# Patient Record
Sex: FEMALE | Race: OTHER | HISPANIC OR LATINO | Employment: FULL TIME | URBAN - METROPOLITAN AREA
[De-identification: names, ages, dates, MRNs, and addresses within clinical notes are randomized per-mention and may not be internally consistent; named-entity substitution may affect disease eponyms.]

---

## 2018-08-07 ENCOUNTER — ANESTHESIA (OUTPATIENT)
Dept: PERIOP | Facility: HOSPITAL | Age: 53
End: 2018-08-07
Payer: COMMERCIAL

## 2018-08-07 ENCOUNTER — HOSPITAL ENCOUNTER (OUTPATIENT)
Facility: HOSPITAL | Age: 53
Setting detail: OUTPATIENT SURGERY
Discharge: HOME | End: 2018-08-07
Attending: COLON & RECTAL SURGERY | Admitting: COLON & RECTAL SURGERY
Payer: COMMERCIAL

## 2018-08-07 ENCOUNTER — ANESTHESIA EVENT (OUTPATIENT)
Dept: PERIOP | Facility: HOSPITAL | Age: 53
End: 2018-08-07
Payer: COMMERCIAL

## 2018-08-07 VITALS
RESPIRATION RATE: 18 BRPM | HEIGHT: 67 IN | TEMPERATURE: 98 F | OXYGEN SATURATION: 98 % | SYSTOLIC BLOOD PRESSURE: 127 MMHG | BODY MASS INDEX: 29.82 KG/M2 | DIASTOLIC BLOOD PRESSURE: 78 MMHG | HEART RATE: 70 BPM | WEIGHT: 190 LBS

## 2018-08-07 DIAGNOSIS — Z12.11 ENCOUNTER FOR SCREENING FOR MALIGNANT NEOPLASM OF COLON: ICD-10-CM

## 2018-08-07 DIAGNOSIS — Z80.0 FAMILY HISTORY OF MALIGNANT NEOPLASM OF DIGESTIVE ORGAN: ICD-10-CM

## 2018-08-07 PROCEDURE — 88305 TISSUE EXAM BY PATHOLOGIST: CPT | Performed by: PATHOLOGY

## 2018-08-07 RX ORDER — PROPOFOL 10 MG/ML
INJECTION, EMULSION INTRAVENOUS AS NEEDED
Status: DISCONTINUED | OUTPATIENT
Start: 2018-08-07 | End: 2018-08-07 | Stop reason: SURG

## 2018-08-07 RX ORDER — SODIUM CHLORIDE 9 MG/ML
50 INJECTION, SOLUTION INTRAVENOUS CONTINUOUS
Status: DISCONTINUED | OUTPATIENT
Start: 2018-08-07 | End: 2018-08-08 | Stop reason: HOSPADM

## 2018-08-07 RX ADMIN — PROPOFOL 20 MG: 10 INJECTION, EMULSION INTRAVENOUS at 11:07

## 2018-08-07 RX ADMIN — PROPOFOL 20 MG: 10 INJECTION, EMULSION INTRAVENOUS at 11:06

## 2018-08-07 RX ADMIN — PROPOFOL 50 MG: 10 INJECTION, EMULSION INTRAVENOUS at 11:00

## 2018-08-07 RX ADMIN — PROPOFOL 20 MG: 10 INJECTION, EMULSION INTRAVENOUS at 10:57

## 2018-08-07 RX ADMIN — PROPOFOL 120 MG: 10 INJECTION, EMULSION INTRAVENOUS at 10:54

## 2018-08-07 RX ADMIN — PROPOFOL 60 MG: 10 INJECTION, EMULSION INTRAVENOUS at 10:55

## 2018-08-07 RX ADMIN — SODIUM CHLORIDE 50 ML/HR: 9 INJECTION, SOLUTION INTRAVENOUS at 09:41

## 2018-08-07 RX ADMIN — PROPOFOL 50 MG: 10 INJECTION, EMULSION INTRAVENOUS at 10:59

## 2018-08-07 NOTE — PERIOPERATIVE NURSING NOTE
ivs out  Tolerated diet  Denies pain  oob to br and passed a large amount of flatus  Eating  Discharged via ambulatory after discharge instructions given and verbalized an understanding of same

## 2018-08-07 NOTE — OP NOTE
OPERATIVE REPORT  PATIENT NAME: Evelina Sales    :  1965  MRN: [id number]  Pt Location:  GI ROOM 02    SURGERY DATE: 2018    Indications:  Screening colonoscopy and a high risk patient with family history of colorectal cancer  Procedure:  Colonoscopy to cecum with polypectomies  Findings:  Rectal and sigmoid polyps  Anesthesia Type: IV Sedation with Anesthesia    Complications: None      Procedure: The patient was in the left lateral position  Sedation was administered by anesthesia  Rectal exam was unremarkable  The scope was introduced and passed without difficulty to the cecum  This was confirmed by the ileocecal valve and appendiceal orifice  The scope was withdrawn  The bowel preparation was good  There are scattered sigmoid diverticulosis  In the distal sigmoid and rectum were 2 5 mm polyps excised by cold snare and collected  Impression:  Status post polypectomy and high risk patient with family history of colorectal cancer  Plan:  Repeat colonoscopy 3 years  Specimen(s):  ID Type Source Tests Collected by Time Destination   1 : sigmoid and Rectum Tissue Polyp, Colorectal TISSUE EXAM Sudhir Manuel MD 2018 1106          Attestation: I was present for the entire procedure        Patient Disposition: PACU      SIGNATURE: Sudhir Manuel MD  DATE: 2018  TIME: 11:15 AM

## 2018-08-07 NOTE — ANESTHESIA PREPROCEDURE EVALUATION
Review of Systems/Medical History      No history of anesthetic complications     Cardiovascular  Exercise tolerance (METS): >4,     Pulmonary  Sleep apnea Sleep Study completed,        GI/Hepatic            Endo/Other     GYN       Hematology   Musculoskeletal       Neurology   Psychology           Physical Exam    Airway    Mallampati score: II  TM Distance: >3 FB  Neck ROM: full     Dental   lower dentures,     Cardiovascular  Cardiovascular exam normal    Pulmonary  Pulmonary exam normal     Other Findings        Anesthesia Plan  ASA Score- 2     Anesthesia Type- IV sedation with anesthesia with ASA Monitors  Additional Monitors:   Airway Plan:         Plan Factors-Patient not instructed to abstain from smoking on day of procedure  Patient did not smoke on day of surgery  Induction- intravenous  Postoperative Plan-     Informed Consent- Anesthetic plan and risks discussed with patient

## 2018-08-07 NOTE — NURSING NOTE
Received patient from GI Lab  VSS  Denies pain but has some cramping  Patient wishes to use bathroom

## 2018-08-07 NOTE — H&P
[facility]  HISTORY AND PHYSICAL EXAM  Abdelrahman Clayton 46 y o  female MRN: [id number]  Unit/Bed#: JAZMINE WOODARD Encounter: [id number]    Assessment/Plan     Indication for colonoscopy: Family History of Colon Cancer    Plan:  Flexible Colonoscopy    Review of Systems    Historical Information   Past Medical History:   Diagnosis Date    Sleep apnea     recently diagnosis     Past Surgical History:   Procedure Laterality Date    BACK SURGERY      fusion    COLONOSCOPY      HEMORROIDECTOMY       Social History   History   Alcohol Use No     History   Drug use: Unknown     History   Smoking Status    Never Smoker   Smokeless Tobacco    Never Used     Family History: non-contributory    Meds/Allergies   all medications and allergies reviewed  Allergies   Allergen Reactions    Morphine Hallucinations    Penicillins Rash       Objective   First Vitals:   Blood Pressure: 123/70 (08/07/18 0927)  Temperature: 97 7 °F (36 5 °C) (08/07/18 0927)  Temp Source: Temporal (08/07/18 0927)  Respirations: 20 (08/07/18 0927)  Height: 5' 7" (170 2 cm) (08/07/18 0927)  Weight - Scale: 86 2 kg (190 lb) (08/07/18 0927)  SpO2: 95 % (08/07/18 0927)    Current Vitals:   Blood Pressure: 123/70 (08/07/18 0927)  Temperature: 97 7 °F (36 5 °C) (08/07/18 0927)  Temp Source: Temporal (08/07/18 0927)  Respirations: 20 (08/07/18 0927)  Height: 5' 7" (170 2 cm) (08/07/18 0927)  Weight - Scale: 86 2 kg (190 lb) (08/07/18 0927)  SpO2: 95 % (08/07/18 0927)    No intake or output data in the 24 hours ending 08/07/18 1050    Invasive Devices     Peripheral Intravenous Line            Peripheral IV 08/07/18 Right Antecubital less than 1 day                Physical Exam    HEENT: Normocephalic, atraumatic  Lungs: Clear  Heart: Regular rate and rhythm  Abdomen: Soft  Nondistended  Nontender  Extremities: No edema    Lab Results: I have personally reviewed pertinent lab results        EKG, Pathology, and Other Studies: I have personally reviewed pertinent reports

## 2018-08-07 NOTE — DISCHARGE SUMMARY
[facility]                                                                                 DISCHARGE SUMMARY        PATIENT NAME: Chelsea Richter    :  1965  MRN: [id number]  Pt Location:  GI ROOM 02    DISCHARGE DATE: 2018    PROCEDURE: Procedure(s) (LRB):  COLONOSCOPY (N/A)    Anesthesia Type: IV Sedation with Anesthesia    Complications: None    Specimen(s):  ID Type Source Tests Collected by Time Destination   1 : sigmoid and Rectum Tissue Polyp, Colorectal TISSUE EXAM Lorenzo James MD 2018 1106        HOSPITAL COURSE: The patient was admitted for elective procedure  The procedure was uncomplicated and the the patient was discharged home post procedure          SIGNATURE: Lorenzo James MD  DATE: 2018  TIME: 11:16 AM

## 2018-08-07 NOTE — DISCHARGE INSTRUCTIONS
[facility]  [facility] [address], [address] [place] [address] [address]  Phone: [phone]    DISCHARGE INSTRUCTIONS:    1   ___ Complete Exam - Normal    2   ___ Exam normal, but entire colon not seen  We will discuss this with you  3   ___ Polyp(s) removed by "burning" - NO pathology report will follow    4   _2__ Polyp(s) removed by excision  Pathology report will be available in 4-5 days   Someone from our office will call you with results  5   ___ Exam prompted biopsies  Pathology report will be available in 4-5 days   Someone from our office will call you with results  6   ___ Exam demonstrated findings that need treatment  Prescriptions will be   Given to you  Return to our office in ____ weeks  Please call for appt  7   ___ Original office visit or colonoscopy findings necessitate an office visit  Please call to set up a new appointment    8   ___ Medication  __________________________________________        [address]:    - Go straight home and rest today    - No driving or drinking alcohol for 24 hours    - Resume regular diet and medications unless otherwise instructed  Coumadin and Plavix are blood thinners  You can resume these medications on __________      IF YOU ARE HAVING ANY FEVER, BLEEDING OR PERSISTENT PAIN IN THE ABDOMEN, PLEASE CALL OUR OFFICE ANY DAY OR TIME  [phone]

## 2018-09-04 ENCOUNTER — OFFICE VISIT (OUTPATIENT)
Dept: URGENT CARE | Age: 53
End: 2018-09-04
Payer: COMMERCIAL

## 2018-09-04 VITALS
BODY MASS INDEX: 30.29 KG/M2 | SYSTOLIC BLOOD PRESSURE: 146 MMHG | RESPIRATION RATE: 16 BRPM | TEMPERATURE: 98.7 F | OXYGEN SATURATION: 96 % | HEIGHT: 67 IN | WEIGHT: 193 LBS | DIASTOLIC BLOOD PRESSURE: 83 MMHG | HEART RATE: 85 BPM

## 2018-09-04 DIAGNOSIS — J02.9 ACUTE PHARYNGITIS, UNSPECIFIED ETIOLOGY: Primary | ICD-10-CM

## 2018-09-04 PROCEDURE — 99283 EMERGENCY DEPT VISIT LOW MDM: CPT | Performed by: PHYSICIAN ASSISTANT

## 2018-09-04 PROCEDURE — G0382 LEV 3 HOSP TYPE B ED VISIT: HCPCS | Performed by: PHYSICIAN ASSISTANT

## 2018-09-04 RX ORDER — DICLOFENAC SODIUM 75 MG/1
75 TABLET, DELAYED RELEASE ORAL
COMMUNITY
Start: 2018-06-26 | End: 2019-06-26

## 2018-09-04 RX ORDER — BIOTIN 5 MG
5 TABLET ORAL
COMMUNITY

## 2018-09-04 RX ORDER — ASCORBIC ACID 100 MG
1 TABLET,CHEWABLE ORAL
COMMUNITY

## 2018-09-04 RX ORDER — FLUTICASONE PROPIONATE 50 MCG
2 SPRAY, SUSPENSION (ML) NASAL
COMMUNITY
Start: 2018-07-19 | End: 2019-07-19

## 2018-09-04 RX ORDER — AMOXICILLIN 500 MG/1
1000 CAPSULE ORAL EVERY 24 HOURS
Qty: 20 CAPSULE | Refills: 0 | Status: SHIPPED | OUTPATIENT
Start: 2018-09-04 | End: 2018-09-14

## 2018-09-04 RX ORDER — MULTIVIT WITH MINERALS/LUTEIN
1000 TABLET ORAL
COMMUNITY

## 2018-09-04 NOTE — PROGRESS NOTES
[address]        NAME: John Nurse is a 46 y o  female  : 1965    MRN: [id number]  DATE: 2018  TIME: 3:37 PM    Assessment and Plan   Acute pharyngitis, unspecified etiology [J02 9]  1  Acute pharyngitis, unspecified etiology  amoxicillin (AMOXIL) 500 mg capsule         Patient Instructions     Motrin and/or Tylenol as needed for fever and pain  Take antibiotic as directed until finished  Follow up with PCP in 3-5 days  Proceed to  ER if symptoms worsen  Chief Complaint     Chief Complaint   Patient presents with    Sore Throat     Pt c/o sore throat and fatigue  Pt took care of granddaughter over the weekend who was just diagnosed with strep  Pt states she drank from the same cup as her granddaughter  History of Present Illness       80-year-old female presents with 2 day history of sore throat  Patient reports she was watching her granddaughter this weekend and she was recently diagnosed with strep throat  Think she may have shared a drink with her  Patient now has sore throat fevers body aches pain  Sore Throat    This is a new problem  The current episode started yesterday  The problem has been waxing and waning  Neither side of throat is experiencing more pain than the other  Maximum temperature: Subjective  The fever has been present for less than 1 day  The pain is moderate  Associated symptoms include swollen glands  Pertinent negatives include no abdominal pain, coughing, diarrhea, drooling, ear discharge, ear pain, headaches, hoarse voice, neck pain, shortness of breath or stridor  She has had exposure to strep (Granddaughter)  She has tried nothing for the symptoms  The treatment provided no relief  Review of Systems   Review of Systems   Constitutional: Negative  HENT: Positive for sore throat  Negative for drooling, ear discharge, ear pain and hoarse voice  Eyes: Negative  Respiratory: Negative    Negative for cough, shortness of breath and stridor  Cardiovascular: Negative  Gastrointestinal: Negative for abdominal pain and diarrhea  Musculoskeletal: Negative  Negative for neck pain  Skin: Negative  Neurological: Negative for headaches           Current Medications       Current Outpatient Prescriptions:     Ascorbic Acid (VITAMIN C) 100 MG CHEW, Chew 1 tablet, Disp: , Rfl:     Biotin 5 MG TABS, Take 5 mg by mouth, Disp: , Rfl:     Calcium Carbonate (CALCIUM-CARB 600 PO), Take 1 tablet by mouth, Disp: , Rfl:     Cholecalciferol (D3-1000) 1000 units tablet, Take 2,000 Units by mouth, Disp: , Rfl:     diclofenac (VOLTAREN) 75 mg EC tablet, Take 75 mg by mouth, Disp: , Rfl:     fluticasone (FLONASE) 50 mcg/act nasal spray, 2 sprays into each nostril, Disp: , Rfl:     Glucosamine-Chondroitin--400-200 MG TABS, , Disp: , Rfl:     Omega-3 Fatty Acids (FISH OIL PO), , Disp: , Rfl:     thiamine 50 MG tablet, Take 50 mg by mouth, Disp: , Rfl:     vitamin E, tocopherol, 1,000 units capsule, Take 1,000 Units by mouth, Disp: , Rfl:     amoxicillin (AMOXIL) 500 mg capsule, Take 2 capsules (1,000 mg total) by mouth every 24 hours for 10 days, Disp: 20 capsule, Rfl: 0    Current Allergies     Allergies as of 09/04/2018 - Reviewed 09/04/2018   Allergen Reaction Noted    Morphine Hallucinations 08/07/2018    Penicillins Rash 08/07/2018    Sulfamethoxazole-trimethoprim GI Intolerance 05/04/2018            The following portions of the patient's history were reviewed and updated as appropriate: allergies, current medications, past family history, past medical history, past social history, past surgical history and problem list      Past Medical History:   Diagnosis Date    Allergic rhinitis     Sleep apnea     recently diagnosis       Past Surgical History:   Procedure Laterality Date    BACK SURGERY      fusion    COLONOSCOPY      HEMORROIDECTOMY      GA COLONOSCOPY FLX DX W/COLLJ SPEC WHEN PFRMD N/A 8/7/2018 Procedure: COLONOSCOPY;  Surgeon: Maria C Edouard MD;  Location: [address] [place] [address] GI LAB; Service: Colorectal       History reviewed  No pertinent family history  Medications have been verified  Objective   /83   Pulse 85   Temp 98 7 °F (37 1 °C) (Tympanic)   Resp 16   Ht 5' 7" (1 702 m)   Wt 87 5 kg (193 lb)   SpO2 96%   BMI 30 23 kg/m²        Physical Exam     Physical Exam   Constitutional: She is oriented to person, place, and time  She appears well-developed and well-nourished  No distress  HENT:   Head: Normocephalic and atraumatic  Right Ear: External ear normal    Left Ear: External ear normal    Nose: Nose normal    Mouth/Throat: Uvula is midline and mucous membranes are normal  Posterior oropharyngeal erythema present  No oropharyngeal exudate, posterior oropharyngeal edema or tonsillar abscesses  Eyes: Conjunctivae are normal  Right eye exhibits no discharge  Left eye exhibits no discharge  Neck: Normal range of motion  Neck supple  Cardiovascular: Normal rate, regular rhythm, normal heart sounds and intact distal pulses  No murmur heard  Pulmonary/Chest: Effort normal and breath sounds normal  No respiratory distress  She has no wheezes  She has no rales  Abdominal: Soft  Bowel sounds are normal  There is no tenderness  Musculoskeletal: Normal range of motion  Lymphadenopathy:     She has cervical adenopathy (Anterior cervical lymphadenopathy noted)  Neurological: She is alert and oriented to person, place, and time  Skin: Skin is warm and dry  Psychiatric: She has a normal mood and affect  Nursing note and vitals reviewed

## 2018-09-04 NOTE — LETTER
September 4, 2018     Patient: Darryl Rincon   YOB: 1965   Date of Visit: 9/4/2018       To Whom it May Concern:    Darryl Rincon was seen in my clinic on 9/4/2018  She may return to work on 09/05/2018  If you have any questions or concerns, please don't hesitate to call  Sincerely,          Lesley Levin PA-C        CC: No Recipients

## 2018-09-04 NOTE — PATIENT INSTRUCTIONS
Motrin and/or Tylenol as needed for fever and pain  Take antibiotic as directed until finished  Follow up with PCP in 3-5 days  Proceed to  ER if symptoms worsen  Pharyngitis   AMBULATORY CARE:   Pharyngitis , or sore throat, is inflammation of the tissues and structures in your pharynx (throat)  Pharyngitis is most often caused by bacteria  It may also be caused by a cold or flu virus  Other causes include smoking, allergies, or acid reflux  Signs and symptoms that may occur with pharyngitis:   · Sore throat or pain when you swallow    · Fever, chills, and body aches    · Hoarse or raspy voice    · Cough, runny or stuffy nose, itchy or watery eyes    · Headache    · Upset stomach and loss of appetite    · Mild neck stiffness    · Swollen glands that feel like hard lumps when you touch your neck    · White and yellow pus-filled blisters in the back of your throat  Call 911 for any of the following:   · You have trouble breathing or swallowing because your throat is swollen or sore  Seek care immediately if:   · You are drooling because it hurts too much to swallow  · Your fever is higher than 102? F (39?C) or lasts longer than 3 days  · You are confused  · You taste blood in your throat  Contact your healthcare provider if:   · Your throat pain gets worse  · You have a painful lump in your throat that does not go away after 5 days  · Your symptoms do not improve after 5 days  · You have questions or concerns about your condition or care  Treatment for pharyngitis:  Viral pharyngitis will go away on its own without treatment  Your sore throat should start to feel better in 3 to 5 days for both viral and bacterial infections  You may need any of the following:  · Antibiotics  treat a bacterial infection  · NSAIDs , such as ibuprofen, help decrease swelling, pain, and fever  NSAIDs can cause stomach bleeding or kidney problems in certain people   If you take blood thinner medicine, always ask your healthcare provider if NSAIDs are safe for you  Always read the medicine label and follow directions  · Acetaminophen  decreases pain and fever  It is available without a doctor's order  Ask how much to take and how often to take it  Follow directions  Acetaminophen can cause liver damage if not taken correctly  Manage your symptoms:   · Gargle salt water  Mix ¼ teaspoon salt in an 8 ounce glass of warm water and gargle  This may help decrease swelling in your throat  · Drink liquids as directed  You may need to drink more liquids than usual  Liquids may help soothe your throat and prevent dehydration  Ask how much liquid to drink each day and which liquids are best for you  · Use a cool-steam humidifier  to help moisten the air in your room and calm your cough  · Soothe your throat  with cough drops, ice, soft foods, or popsicles  Prevent the spread of pharyngitis:  Cover your mouth and nose when you cough or sneeze  Do not share food or drinks  Wash your hands often  Use soap and water  If soap and water are unavailable, use an alcohol based hand   Follow up with your healthcare provider as directed:  Write down your questions so you remember to ask them during your visits  © 2017 [address]  Information is for End User's use only and may not be sold, redistributed or otherwise used for commercial purposes  All illustrations and images included in CareNotes® are the copyrighted property of [employer]  or [employer]  The above information is an  only  It is not intended as medical advice for individual conditions or treatments  Talk to your doctor, nurse or pharmacist before following any medical regimen to see if it is safe and effective for you

## 2020-12-01 ENCOUNTER — OFFICE VISIT (OUTPATIENT)
Dept: URGENT CARE | Age: 55
End: 2020-12-01
Payer: COMMERCIAL

## 2020-12-01 VITALS
HEART RATE: 84 BPM | OXYGEN SATURATION: 96 % | BODY MASS INDEX: 29.03 KG/M2 | TEMPERATURE: 98.5 F | HEIGHT: 67 IN | WEIGHT: 185 LBS | RESPIRATION RATE: 18 BRPM

## 2020-12-01 DIAGNOSIS — R05.9 COUGH: Primary | ICD-10-CM

## 2020-12-01 PROCEDURE — 99213 OFFICE O/P EST LOW 20 MIN: CPT | Performed by: PHYSICIAN ASSISTANT

## 2020-12-01 PROCEDURE — U0003 INFECTIOUS AGENT DETECTION BY NUCLEIC ACID (DNA OR RNA); SEVERE ACUTE RESPIRATORY SYNDROME CORONAVIRUS 2 (SARS-COV-2) (CORONAVIRUS DISEASE [COVID-19]), AMPLIFIED PROBE TECHNIQUE, MAKING USE OF HIGH THROUGHPUT TECHNOLOGIES AS DESCRIBED BY CMS-2020-01-R: HCPCS | Performed by: PHYSICIAN ASSISTANT

## 2020-12-01 RX ORDER — ONDANSETRON 4 MG/1
4 TABLET, FILM COATED ORAL EVERY 8 HOURS PRN
Qty: 20 TABLET | Refills: 0 | Status: SHIPPED | OUTPATIENT
Start: 2020-12-01

## 2020-12-02 LAB — SARS-COV-2 RNA SPEC QL NAA+PROBE: DETECTED

## 2020-12-03 ENCOUNTER — TELEPHONE (OUTPATIENT)
Dept: URGENT CARE | Age: 55
End: 2020-12-03

## 2024-05-31 DIAGNOSIS — Z00.6 ENCOUNTER FOR EXAMINATION FOR NORMAL COMPARISON OR CONTROL IN CLINICAL RESEARCH PROGRAM: ICD-10-CM
